# Patient Record
Sex: MALE | Race: WHITE
[De-identification: names, ages, dates, MRNs, and addresses within clinical notes are randomized per-mention and may not be internally consistent; named-entity substitution may affect disease eponyms.]

---

## 2019-10-09 ENCOUNTER — HOSPITAL ENCOUNTER (OUTPATIENT)
Dept: HOSPITAL 35 - HYPER | Age: 84
End: 2019-10-09
Attending: EMERGENCY MEDICINE
Payer: COMMERCIAL
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
## 2019-10-09 DIAGNOSIS — K21.9: ICD-10-CM
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
## 2019-10-09 DIAGNOSIS — Z79.82: ICD-10-CM
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
## 2019-10-09 DIAGNOSIS — T81.89XA: Primary | ICD-10-CM

## 2019-10-09 DIAGNOSIS — S00.03XA: ICD-10-CM

## 2019-10-14 ENCOUNTER — HOSPITAL ENCOUNTER (OUTPATIENT)
Dept: HOSPITAL 35 - HYPER | Age: 84
End: 2019-10-14
Attending: EMERGENCY MEDICINE
Payer: COMMERCIAL

## 2019-10-14 DIAGNOSIS — T81.89XD: Primary | ICD-10-CM

## 2019-10-14 DIAGNOSIS — Z79.82: ICD-10-CM

## 2019-10-14 DIAGNOSIS — S00.03XD: ICD-10-CM
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
## 2019-10-14 DIAGNOSIS — K21.9: ICD-10-CM
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
## 2019-10-14 DIAGNOSIS — E07.89: ICD-10-CM

## 2019-10-18 ENCOUNTER — HOSPITAL ENCOUNTER (OUTPATIENT)
Dept: HOSPITAL 35 - HYPER | Age: 84
End: 2019-10-18
Attending: EMERGENCY MEDICINE
Payer: COMMERCIAL

## 2019-10-18 DIAGNOSIS — T81.89XD: Primary | ICD-10-CM
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
## 2019-10-18 DIAGNOSIS — S00.03XD: ICD-10-CM

## 2019-10-18 DIAGNOSIS — E07.89: ICD-10-CM

## 2019-10-18 DIAGNOSIS — K21.9: ICD-10-CM

## 2019-10-18 DIAGNOSIS — Z79.82: ICD-10-CM
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
## 2019-10-24 ENCOUNTER — HOSPITAL ENCOUNTER (OUTPATIENT)
Dept: HOSPITAL 35 - OR | Age: 84
Discharge: HOME | End: 2019-10-24
Attending: SURGERY
Payer: COMMERCIAL

## 2019-10-24 VITALS — DIASTOLIC BLOOD PRESSURE: 67 MMHG | SYSTOLIC BLOOD PRESSURE: 161 MMHG

## 2019-10-24 VITALS — BODY MASS INDEX: 37.13 KG/M2 | HEIGHT: 68 IN | WEIGHT: 245 LBS
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
## 2019-10-24 DIAGNOSIS — T81.89XA: Primary | ICD-10-CM

## 2019-10-24 DIAGNOSIS — Z98.890: ICD-10-CM

## 2019-10-24 DIAGNOSIS — S00.03XA: ICD-10-CM
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
## 2019-10-24 DIAGNOSIS — E03.9: ICD-10-CM

## 2019-10-24 DIAGNOSIS — G47.30: ICD-10-CM
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
## 2019-10-24 DIAGNOSIS — C49.0: ICD-10-CM

## 2019-10-24 DIAGNOSIS — Z79.899: ICD-10-CM

## 2019-10-24 PROCEDURE — 62900: CPT

## 2019-10-24 PROCEDURE — 50403: CPT

## 2019-10-24 PROCEDURE — 62110: CPT

## 2019-10-24 PROCEDURE — 57103: CPT

## 2019-10-24 PROCEDURE — 50386 REMOVE STENT VIA TRANSURETH: CPT

## 2019-10-24 PROCEDURE — 70005: CPT

## 2019-10-24 PROCEDURE — 50010 RENAL EXPLORATION: CPT

## 2019-10-24 PROCEDURE — 53078: CPT

## 2019-10-24 PROCEDURE — 50101: CPT

## 2019-10-28 NOTE — O
[facility]
[place]   [address]                     OPERATIVE REPORT              
_______________________________________________________________________________
 
Name:       PIRNCESS CABRERA               Room #:                     [facility] 
M.R.#:      [id number]                       Account #:      [id number]  
Admission:  10/24/19    Attend Phys:    Madhu Da Silva MD  
Discharge:  10/24/19    Date of Birth:  [birth year]  
                                                          Report #: [id number]
                                                                    [id number]   
_______________________________________________________________________________
THIS REPORT FOR:   //name//                          
 
CC: Madhu Henderson
 
DATE OF SERVICE:  10/24/2019
 
 
PROCEDURE PERFORMED:  Excisional debridement of scalp wound down to bone.
 
PREPROCEDURAL DIAGNOSIS:  Scalp wound.
 
POSTPROCEDURAL DIAGNOSES:  Infected hematoma.
 
SURGEON:  Dr. Da Silva.
 
ASSISTANT:  None.
 
ANESTHESIA:  Local.
 
ESTIMATED BLOOD LOSS:  20 mL.
 
URINE OUTPUT:  Not measured.
 
COMPLICATIONS:  None.
 
SPECIMENS:
1.  Tissue sent for Gram stain, anaerobic, and aerobic culture.
2.  Tissue sent for histopathology.
 
FINDINGS:
1. 7cm long x 3.5cm wide x 0.5cm deep at conclusion of procedure. 
2. Surgicel left in wound bed. 
INDICATIONS FOR PROCEDURE:  The patient is a very pleasant 88-year-old gentleman
who had a recent biopsy on his scalp.  He developed a severe wound following
this.  I was consulted as an outpatient for surgical debridement.  The risks,
benefits and alternatives of the procedure were discussed with the patient and
daughter.  The risks discussed included but were not limited to the risk of
bleeding, infection, worsening wound, need for potential flap closure in the
future, which would require a plastic consult, anesthesia (cardiac, pulmonary,
neurologic type complications), and death.  The patient and daughter had the
opportunity to ask questions.  All questions were answered to the best of my
ability.  At the end of the discussion, they did wish to proceed with surgery.
 
DESCRIPTION OF PROCEDURE:  After informed consent was obtained of the above, the
patient was taken to the operating room and placed in the supine position. 
 
 
 
[address]   [address]                     OPERATIVE REPORT              
_______________________________________________________________________________
 
Name:       RICKPRINCESS LETICIA               Room #:                     DEP SD 
PHILLY#:      [id number]                       Account #:      [id number]  
Admission:  10/24/19    Attend Phys:    Madhu Da Silva MD  
Discharge:  10/24/19    Date of Birth:  [birth year]  
                                                          Report #: [id number]
                                                                    [id number]   
_______________________________________________________________________________
General anesthesia was induced.  Preprocedure antibiotics were administered. 
His scalp was prepped and draped in the usual sterile fashion and a timeout was
performed.  Electrocautery was used to excise fresh margins on the scalp wound. 
The scalp wound was rather large in size.  The scalp wound at the end of the
debridement measuring 7 cm x 3.5 cm x 0.5 cm deep.  The necrotic tissues of the
scalp wound were then excised using electrocautery.  Unfortunately, the wound
did go all the way to the skull bone.  The patient was hemostatic.  A piece of
Surgicel was placed in the wound bed.  The wound was irrigated out with about 3
liters of copious warm irrigation.  The wound was then packed with Dakin's
soaked gauze.  The patient tolerated the procedure well and there were no
adverse events throughout the course of the procedure.  At the end of the
procedure, he was extubated and transferred to the PACU in stable condition.
 
 
 
 
 
 
 
 
 
 
 
 
 
 
 
 
 
 
 
 
 
 
 
 
 
 
 
 
 
 
 
 
  <ELECTRONICALLY SIGNED>
   By: Madhu Da Silva MD          
  10/28/19     1014
D: 10/24/19 1058                           _____________________________________
T: 10/24/19 1130                           Madhu Da Silva MD            /nt

## 2019-11-01 NOTE — PATH
[facility]
[address]
[place]   [address]                     PATHOLOGY RPT PROCEDURE       
_______________________________________________________________________________
 
Name:       PRINCESS CABRERA               Room #:                     DEP [facility] 
M.R.#:      [id number]     Account #:      [id number]  
Admission:  10/24/19    Date of Birth:  [birth year]  
Discharge:  10/24/19                                    Report #:    [id number]
                                                        Path Case #: [id number]
_______________________________________________________________________________
 
[facility] Accession Number: [id number]
.                                                                01
Material submitted:                                        .
scalp - SCALP TISSUE
.                                                                01
Clinical history:                                          .
localized swelling, mass and lump in head
.                                                                02
**********************************************************************
Diagnosis:
Skin, scalp mass, excisional biopsy:
- POORLY DIFERENTIATED ANGIOSARCOMA (PLEASE SEE COMMENT).
- EXTENDS TO ALL SAMPLED MARGINS.
- Extensive surface ulceration identified.
LBQ  11/01/2019  1128 Local
**********************************************************************
.                                                                02
Comment:
Examination shows a poorly differentiated epithelioid neoplasm with
numerous red cell lakes, occasional vascular channels, increased mitotic
activity, and areas of necrosis.  Melanin pigmentation, or overlying
malignant epithelial component are not identified.  Multiple properly
controlled immunohistochemical stains are performed on block A2 to further
characterize this neoplasm.  The malignant cells show strong membranous
reactivity with CD31 which is also reactive in the vessels present in the
background.  CD34 shows no reactivity within the malignant cells.  The
other non-reactive immunohistochemical stains included, melan A (Mart-1),
p63, AE1/AE3, S100 as well as thrombomodulin.  AE1/AE3
(epithelial/carcinoma marker) is non-reactive as well.  D2-40 highlights
lymphatics.  CD34 is reactive within the background non-neoplastic vessels
within the lesion.  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
differentiated angiosarcoma.
.
Dr. Pepper Mares (Board Certified Dermatopathologist) along with
additional pathologists has seen this case and concurs with my diagnosis.
.
Dr. Madhu Da Silva was informed of the preliminary findings in the morning
of 10/29/19, and updated constantly with the final interpretation conveyed
to him at approximately 3:30 p.m. on 10/31/19.
(IUV/db; 11/01/2019)
.                                                                02
Electronically signed:                                     .
Stormy Aguila MD, Pathologist
NPI- [id number]
.                                                                01
Gross description:                                         .
Received in formalin, labeled "Princess Cabrera, scalp tissue", is an
 
 
[place] [address]                     PATHOLOGY RPT PROCEDURE       
_______________________________________________________________________________
 
Name:       PRINCESS CABRERA               Room #:                     DEP [facility] 
M.R.#:      [id number]     Account #:      [id number]  
Admission:  10/24/19    Date of Birth:  [birth year]  
Discharge:  10/24/19                                    Report #:    [id number]
                                                        Path Case #: [id number]
_______________________________________________________________________________
unoriented roughly oval segment tan-brown of skin measuring 1.7 x 1.5 cm
excised to a depth of 0.4 cm. The resection margins are inked black.  The
skin surface is irregular and diffusely hemorrhagic.  Serially sectioned
into 6 pieces and entirely submitted in A1-A3 (contiguous sections). ([facility];
10/24/2019)
[facility]  10/24/2019  [address] [facility]
.                                                                02
Pathologist provided ICD-10:
C49.0
.                                                                02
CPT                                                        .
804432, S61009, O62924
Specimen Comment: A courtesy copy of this report has been sent to [phone],
[phone]
Specimen Comment: [id number]
Specimen Comment: Report sent to  / DR BRIDGES
***Performed at:  01
   [facility], [place]  [id number]
   MD Kenny Frias MD Phone:  [phone]
***Performed at:  02
   [facility]  [id number]
   MD Stormy Aguila MD Phone:  [phone]

## 2019-11-04 ENCOUNTER — HOSPITAL ENCOUNTER (OUTPATIENT)
Dept: HOSPITAL 35 - HYPER | Age: 84
End: 2019-11-04
Attending: EMERGENCY MEDICINE
Payer: COMMERCIAL
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
## 2019-11-04 DIAGNOSIS — S00.93XD: ICD-10-CM

## 2019-11-04 DIAGNOSIS — Z79.82: ICD-10-CM

## 2019-11-04 DIAGNOSIS — C43.4: ICD-10-CM

## 2019-11-04 DIAGNOSIS — K21.9: ICD-10-CM

## 2019-11-04 DIAGNOSIS — T81.89XD: Primary | ICD-10-CM
